# Patient Record
Sex: FEMALE | Race: WHITE | Employment: FULL TIME | URBAN - METROPOLITAN AREA
[De-identification: names, ages, dates, MRNs, and addresses within clinical notes are randomized per-mention and may not be internally consistent; named-entity substitution may affect disease eponyms.]

---

## 2021-06-12 ENCOUNTER — HOSPITAL ENCOUNTER (EMERGENCY)
Facility: CLINIC | Age: 51
Discharge: HOME | End: 2021-06-12
Attending: EMERGENCY MEDICINE | Admitting: EMERGENCY MEDICINE
Payer: COMMERCIAL

## 2021-06-12 ENCOUNTER — APPOINTMENT (OUTPATIENT)
Dept: ULTRASOUND IMAGING | Facility: CLINIC | Age: 51
End: 2021-06-12
Attending: EMERGENCY MEDICINE
Payer: COMMERCIAL

## 2021-06-12 VITALS
OXYGEN SATURATION: 96 % | TEMPERATURE: 98.2 F | RESPIRATION RATE: 17 BRPM | BODY MASS INDEX: 31.32 KG/M2 | HEIGHT: 67 IN | HEART RATE: 98 BPM | SYSTOLIC BLOOD PRESSURE: 159 MMHG | DIASTOLIC BLOOD PRESSURE: 107 MMHG

## 2021-06-12 DIAGNOSIS — N93.8 DYSFUNCTIONAL UTERINE BLEEDING: ICD-10-CM

## 2021-06-12 LAB
BASOPHILS # BLD AUTO: 0.1 10E9/L (ref 0–0.2)
BASOPHILS NFR BLD AUTO: 0.8 %
DIFFERENTIAL METHOD BLD: NORMAL
EOSINOPHIL # BLD AUTO: 0.1 10E9/L (ref 0–0.7)
EOSINOPHIL NFR BLD AUTO: 1.4 %
ERYTHROCYTE [DISTWIDTH] IN BLOOD BY AUTOMATED COUNT: 12.2 % (ref 10–15)
HCG SERPL QL: NEGATIVE
HCT VFR BLD AUTO: 40.5 % (ref 35–47)
HGB BLD-MCNC: 13.6 G/DL (ref 11.7–15.7)
IMM GRANULOCYTES # BLD: 0.1 10E9/L (ref 0–0.4)
IMM GRANULOCYTES NFR BLD: 0.6 %
LYMPHOCYTES # BLD AUTO: 3 10E9/L (ref 0.8–5.3)
LYMPHOCYTES NFR BLD AUTO: 39 %
MCH RBC QN AUTO: 29.9 PG (ref 26.5–33)
MCHC RBC AUTO-ENTMCNC: 33.6 G/DL (ref 31.5–36.5)
MCV RBC AUTO: 89 FL (ref 78–100)
MONOCYTES # BLD AUTO: 0.6 10E9/L (ref 0–1.3)
MONOCYTES NFR BLD AUTO: 7.6 %
NEUTROPHILS # BLD AUTO: 3.9 10E9/L (ref 1.6–8.3)
NEUTROPHILS NFR BLD AUTO: 50.6 %
NRBC # BLD AUTO: 0 10*3/UL
NRBC BLD AUTO-RTO: 0 /100
PLATELET # BLD AUTO: 291 10E9/L (ref 150–450)
RBC # BLD AUTO: 4.55 10E12/L (ref 3.8–5.2)
WBC # BLD AUTO: 7.8 10E9/L (ref 4–11)

## 2021-06-12 PROCEDURE — 99284 EMERGENCY DEPT VISIT MOD MDM: CPT | Mod: 25

## 2021-06-12 PROCEDURE — 84703 CHORIONIC GONADOTROPIN ASSAY: CPT | Performed by: EMERGENCY MEDICINE

## 2021-06-12 PROCEDURE — 85025 COMPLETE CBC W/AUTO DIFF WBC: CPT | Performed by: EMERGENCY MEDICINE

## 2021-06-12 PROCEDURE — 76830 TRANSVAGINAL US NON-OB: CPT

## 2021-06-12 ASSESSMENT — ENCOUNTER SYMPTOMS
DYSURIA: 0
FEVER: 0

## 2021-06-13 NOTE — ED TRIAGE NOTES
Heavy vaginal bleeding with clots X 2 days. Tonight bleeding became worse. Pt reports she is menopausal. Patient reports some cramping. No hx of fibroids. Denies any dizziness, lightheadedness, chest pain or SOB.

## 2021-06-13 NOTE — ED PROVIDER NOTES
"  History   Chief Complaint:  Vaginal Bleeding     The history is provided by the patient.      Coreen Wilhelm is a 51 year old female with history of miscarriage who presents with abnormal vaginal bleeding. Since , she has been experiencing consisted vaginal spotting. Two days ago, she began to experience heavy bleeding. This bleeding is also associated with more cramping than usual. Tonight she began to experience increased bleeding with blood clots prompting her visit to the ED. The patient has not taken any medication for her pain. She reports that she had her last period about one month ago. Her recent periods have been lighter and shorter than usual with one missed period reported in October. The patient reports that the pain reminds her of a past marriage but also states that her current partner has had a vasectomy. The patient denies any fever or pain with urination.       Review of Systems   Constitutional: Negative for fever.   Genitourinary: Positive for vaginal bleeding and vaginal pain (cramping). Negative for dysuria.   All other systems reviewed and are negative.      Allergies:  The patient has no known allergies.     Medications:  Florastor    Past Medical History:    Abnormal maternal glucose tolerance, complicating pregnancy, childbirth or the puerperium  Obesity  Unspecified   Benign neoplasm of skin  Anxiety  Perimenopausal    Past Surgical History:    [place] tooth extraction    Family History:    Mother: thyroid disease  Father: musculoskeletal disorder    Social History:   The patient presents to the ED with her partner.     Physical Exam     Patient Vitals for the past 24 hrs:   BP Temp Temp src Pulse Resp SpO2 Height   21 2320 -- -- -- -- -- 96 % --   21 2319 (!) 159/107 -- -- 98 -- -- --   21 -- 98.2  F (36.8  C) Oral -- -- -- --   21 (!) 185/101 -- Oral 109 17 98 % 1.702 m (5' 7\")       Physical Exam    Eye:  Pupils are equal, round, and " reactive.  Extraocular movements intact.    ENT:  No rhinorrhea.  Moist mucus membranes.  Normal tongue and tonsil.    Cardiac:  Regular rate and rhythm.  No murmurs, gallops, or rubs.    Pulmonary:  Clear to auscultation bilaterally.  No wheezes, rales, or rhonchi.    Abdomen:  Positive bowel sounds.  Abdomen is soft and non-distended, without focal tenderness.    Musculoskeletal:  Normal movement of all extremities without evidence for deficit.    Skin:  Warm and dry without rashes.    Neurologic:  Non-focal exam without asymmetric weakness or numbness.     Psychiatric:  Normal affect with appropriate interaction with examiner.      Emergency Department Course     Imaging:  US Pelvic Complete w Transvaginal   Preliminary Result   IMPRESSION:   1.  Endometrium is prominent and inhomogeneous measuring 1.6 cm.                   Laboratory:   CBC: WBC 7.8, HGB 13.6,   HCG Qualitative pregnancy (blood): Negative    Emergency Department Course:    Reviewed:  2140 I reviewed nursing notes, vitals, past medical history and care everywhere    Assessments:  2150 I obtained history and examined the patient as noted above.   2315 I rechecked the patient and explained findings.     Disposition:  The patient was discharged to home.       Impression & Plan     Medical Decision Making:  This 51-year-old woman who is starting to experience symptoms of menopause presents to us with heavy vaginal bleeding which began approximately 12 days ago, much more heavy over the past 2 days.  She notes this feels like a very heavy period or even similar to prior miscarriages where she has passed several clots today and experiences pelvic cramping.  She denies having any other urinary or GI symptoms.  With the excessive bleeding today, she became concerned that this was excessive and presents to us for evaluation.  She denies any concerns for pregnancy as her monogamous significant other has undergone a vasectomy.    On my exam, she  appears clinically well.  Vital signs are reassuring.  Abdomen is soft and benign.  CBC was obtained which shows a normal hemoglobin.  She is not pregnant.  Ultrasound shows a prominent and inhomogenous endometrium which would be consistent with her heavy bleeding and dysfunctional uterine bleeding.    With this, I spoke with her at length about the dysfunction of the hormone axis about the times of menopause.  I do not believe that she has a more nefarious cause of bleeding and I feel she is safe for discharge home with her normal hemoglobin and soft abdomen.  I did offer to start her on oral contraception to get better control of her hormones.  However, she chiefly came in to be reassured that this was not something more serious and she would rather allow this to continue naturally and to follow-up with her OB/GYN on Monday.  I fully support this as I feel she is low risk.  However, I was exceedingly clear she should return to us if she develops increasing bleeding, syncope, pain, fever, or any other emergent concerns.      Diagnosis:    ICD-10-CM    1. Dysfunctional uterine bleeding  N93.8        Scribe Disclosure:  I, Eri Serrano and Chrissy Wade, am serving as a scribe at 9:36 PM on 6/12/2021 to document services personally performed by Trierweiler, Chad A, MD based on my observations and the provider's statements to me.          Trierweiler, Chad A, MD  06/12/21 [id number]